# Patient Record
Sex: FEMALE
[De-identification: names, ages, dates, MRNs, and addresses within clinical notes are randomized per-mention and may not be internally consistent; named-entity substitution may affect disease eponyms.]

---

## 2019-02-11 ENCOUNTER — HOSPITAL ENCOUNTER (EMERGENCY)
Dept: HOSPITAL 97 - ER | Age: 44
Discharge: HOME | End: 2019-02-11
Payer: SELF-PAY
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
## 2019-02-11 DIAGNOSIS — M25.552: ICD-10-CM

## 2019-02-11 DIAGNOSIS — M47.892: Primary | ICD-10-CM

## 2019-02-11 PROCEDURE — 71045 X-RAY EXAM CHEST 1 VIEW: CPT

## 2019-02-11 PROCEDURE — 81003 URINALYSIS AUTO W/O SCOPE: CPT

## 2019-02-11 PROCEDURE — 87086 URINE CULTURE/COLONY COUNT: CPT

## 2019-02-11 PROCEDURE — 99284 EMERGENCY DEPT VISIT MOD MDM: CPT

## 2019-02-11 PROCEDURE — 87088 URINE BACTERIA CULTURE: CPT

## 2019-02-11 PROCEDURE — 72040 X-RAY EXAM NECK SPINE 2-3 VW: CPT

## 2019-02-11 PROCEDURE — 72100 X-RAY EXAM L-S SPINE 2/3 VWS: CPT

## 2019-02-11 PROCEDURE — 72170 X-RAY EXAM OF PELVIS: CPT

## 2019-02-11 PROCEDURE — 81025 URINE PREGNANCY TEST: CPT
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
## 2019-02-11 NOTE — XMS REPORT
Patient Summary Document

 Created on:2019



Patient:MORENITA STEIN

Sex:Female

:1975

External Reference #:[id number]





Demographics







 Address  [address] 



   [place] [address]

 

 Home Phone  [phone]

 

 Preferred Language  Unknown

 

 Marital Status  Unknown

 

 Gnosticism Affiliation  Unknown

 

 Race  Unknown

 

 Additional Race(s)  Unavailable

 

 Ethnic Group  Unknown









Author







 Organization  [facility]

 

 Address  [address]



   [place] [address]

 

 Phone  [phone]









Care Team Providers







 Name  Role  Phone

 

 Unavailable  Unavailable  Unavailable









Problems

This patient has no known problems.



Allergies, Adverse Reactions, Alerts

This patient has no known allergies or adverse reactions.



Medications

This patient has no known medications.



Results







 Test Description  Test Time  Test Comments  Text Results  Atomic Results  
Result Comments









 SCR MAMM BILATERAL MEDINA  2019 08:13:30     - SCR MAMM BILATERAL MEDINA CAD
  



 CAD DIGITAL      DIGITALBILATERAL FIRST EVER DIGITAL  



       SCREENING MAMMOGRAM 3D/2D WITH CAD:  



       2019CLINICAL: Asymptomatic.  



       Digital breast tomosynthesis was  



       performed in addition to routine CC  



       and MLO views.  Current  



       mammographic images were evaluated  



       by either a [employer] M-Vu or a  



       [employer] ImageChecker CAD (computer  



       aided detection system).  No prior  



       exams were available for  



       comparison. This is a baseline  



       exam.There are scattered  



       fibroglandular tissues in both  



       breasts.  There is a benign  



       intramammary node in both breasts.  



       No suspicious mass, architectural  



       distortion, malignant type  



       calcification, or lymph node  



       abnormality detected.  IMPRESSION:  



       BENIGNThere is no mammographic  



       evidence of malignancy. Resume  



       annual screening mammography in one  



       year.  Natanael Sena M.D.  



        rb/:2019 08:13:30  Imaging  



       Technologist: Crys Crain MM, The  



       [facility]  



       BIRADS 1-2 Normal  Mammogram  



       BI-RADS: 2 Benign
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
